# Patient Record
Sex: MALE | Race: OTHER | HISPANIC OR LATINO
[De-identification: names, ages, dates, MRNs, and addresses within clinical notes are randomized per-mention and may not be internally consistent; named-entity substitution may affect disease eponyms.]

---

## 2018-03-28 ENCOUNTER — TRANSCRIPTION ENCOUNTER (OUTPATIENT)
Age: 33
End: 2018-03-28

## 2018-03-29 ENCOUNTER — APPOINTMENT (OUTPATIENT)
Dept: PHYSICAL MEDICINE AND REHAB | Facility: CLINIC | Age: 33
End: 2018-03-29

## 2018-03-29 PROBLEM — Z00.00 ENCOUNTER FOR PREVENTIVE HEALTH EXAMINATION: Status: ACTIVE | Noted: 2018-03-29

## 2020-11-14 ENCOUNTER — OUTPATIENT (OUTPATIENT)
Dept: OUTPATIENT SERVICES | Facility: HOSPITAL | Age: 35
LOS: 1 days | End: 2020-11-14
Payer: COMMERCIAL

## 2020-11-14 DIAGNOSIS — Z11.59 ENCOUNTER FOR SCREENING FOR OTHER VIRAL DISEASES: ICD-10-CM

## 2020-11-14 PROCEDURE — U0003: CPT

## 2020-11-15 DIAGNOSIS — Z11.59 ENCOUNTER FOR SCREENING FOR OTHER VIRAL DISEASES: ICD-10-CM

## 2020-11-15 LAB — SARS-COV-2 RNA SPEC QL NAA+PROBE: SIGNIFICANT CHANGE UP

## 2021-04-23 ENCOUNTER — TRANSCRIPTION ENCOUNTER (OUTPATIENT)
Age: 36
End: 2021-04-23

## 2021-07-16 ENCOUNTER — TRANSCRIPTION ENCOUNTER (OUTPATIENT)
Age: 36
End: 2021-07-16

## 2022-04-20 ENCOUNTER — APPOINTMENT (OUTPATIENT)
Dept: ORTHOPEDIC SURGERY | Facility: CLINIC | Age: 37
End: 2022-04-20
Payer: COMMERCIAL

## 2022-04-20 VITALS — BODY MASS INDEX: 23.7 KG/M2 | WEIGHT: 160 LBS | HEIGHT: 69 IN

## 2022-04-20 DIAGNOSIS — J45.909 UNSPECIFIED ASTHMA, UNCOMPLICATED: ICD-10-CM

## 2022-04-20 DIAGNOSIS — Z78.9 OTHER SPECIFIED HEALTH STATUS: ICD-10-CM

## 2022-04-20 DIAGNOSIS — Z87.891 PERSONAL HISTORY OF NICOTINE DEPENDENCE: ICD-10-CM

## 2022-04-20 DIAGNOSIS — M25.561 PAIN IN RIGHT KNEE: ICD-10-CM

## 2022-04-20 DIAGNOSIS — M23.91 UNSPECIFIED INTERNAL DERANGEMENT OF RIGHT KNEE: ICD-10-CM

## 2022-04-20 PROCEDURE — 20611 DRAIN/INJ JOINT/BURSA W/US: CPT

## 2022-04-20 PROCEDURE — 99214 OFFICE O/P EST MOD 30 MIN: CPT | Mod: 25

## 2022-04-20 NOTE — HISTORY OF PRESENT ILLNESS
[de-identified] : The patient is a 36 year old right hand dominant male who presents today complaining of right knee pain.  Date of Injury/Onset: 11/1/21 Pain: At Rest: 3/10  With Activity: 8/10  Mechanism of injury: prolonged activity, training for a marathon This is not a Work Related Injury being treated under Worker's Compensation. This is not an athletic injury occurring associated with an interscholastic or organized sports team. Quality of symptoms: sharp pain, mild swelling, decreased ROM, instability, weakness Improves with: rest, ice, NSAIDs Worse with: prolonged activity, stairs, walking, running, stairs Prior treatment: none Prior Imaging: none Out of work/sport: currently working School/Sport/Position/Occupation: finance Additional Information: None

## 2022-04-20 NOTE — PHYSICAL EXAM
[de-identified] : Constitutional: The general appearance of the patient is well developed, well nourished, no deformities and well groomed.\par \par Gait: Gait and function is as follows: normal gait.\par \par Skin: Head and neck visualized skin is normal. Left lower extremity visualized skin is normal Right lower extremity visualized skin is normal. Thoracic Skin of the thoracic spine shows visualized skin is normal.\par \par Cardiovascular: palpable dorsalis pedis pulse bilaterally, good capillary refill in the digits of the bilateral lower extremities and no temperature or color changes in the bilateral lower extremities.\par \par Lymphatic: Normal Palpation of lymph nodes in the inguinal.\par \par Neurologic: heel rub from knee to ankle intact in the bilateral lower extremities. Deep Tendon Reflexes in Upper and Lower Extremities The reflexes are present and symmetrical in the bilateral lower extremities and No clonus in the lower extremities. The patient is oriented to time, place and person. Sensation to light touch intact in the bilateral lower extremities. Mood and Affect is normal.\par  \par \par Right Knee: Inspection of the knee is as follows: IT band tightness  \par IT band tenderness\par IT band swelling \par \par 3/23/22: X-Ray Examination of the KNEE 4 or more views Right AP, lateral, skyline and AP both knees standing view shows there are no fractures, subluxations or dislocations. No significant abnormalities are seen.

## 2022-04-20 NOTE — DISCUSSION/SUMMARY
[de-identified] : Cortisone Knee Injection - Right (IT BAND) \par The risks, benefits, and alternatives to cortisone injection were explained in full to the patient.  Risks outlined include but are not limited to infection, sepsis, bleeding, scarring, skin discoloration, temporary increase in pain, syncopal episode, failure to resolve symptoms, allergic reaction, symptom recurrence, and elevation of blood sugar in diabetics.  Patient understood the risks.  All questions were answered.  After discussion of options, patient requested an injection.  Oral informed consent was obtained and sterile prep was done of the injection site.  A mixture of 40mg of Kenalog, 2cc of 1% Lidocaine was sterilely prepared by me.  Sterile technique was used to introduce the mixture into the right knee. Ultrasound was used for proper needle placement.  Patient tolerated the procedure well.  Advised to ice the injection site this evening. \par \par Follow up after right knee MRI to eval IT band rupture. Patient has ongoing pain despite physical therapy, anti-inflammatories, rest\par \par \par The patient is instructed on a home exercise program.\par \par XIN FERNANDEZ Acting as a Scribe for Dr. Young\par I, Xin Fernandez, attest that this documentation has been prepared under the direction and in the presence of Provider Juan Young MD.\par \par Activity Modification\par The patient was advised to modify their activities.\par \par Dx / Natural History\par The patient was advised of the diagnosis.  The natural history of the pathology was explained in full to the patient in layman's terms.  Several different treatment options were discussed and explained in full to the patient including the risks and benefits of both surgical and non-surgical treatments.  All questions and concerns were answered.\par \par Pain Guide Activities\par The patient was advised to let pain guide the gradual advancement of activities.\par \par RICE\par I explained to the patient that rest, ice, compression, and elevation would benefit them.  They may return to activity after follow-up or when they no longer have any pain.

## 2022-08-04 ENCOUNTER — APPOINTMENT (OUTPATIENT)
Dept: ORTHOPEDIC SURGERY | Facility: CLINIC | Age: 37
End: 2022-08-04

## 2022-08-04 ENCOUNTER — NON-APPOINTMENT (OUTPATIENT)
Age: 37
End: 2022-08-04

## 2022-08-04 DIAGNOSIS — M76.31 ILIOTIBIAL BAND SYNDROME, RIGHT LEG: ICD-10-CM

## 2022-08-04 PROCEDURE — 99204 OFFICE O/P NEW MOD 45 MIN: CPT

## 2022-08-04 PROCEDURE — 73503 X-RAY EXAM HIP UNI 4/> VIEWS: CPT

## 2022-08-05 NOTE — ADDENDUM
[FreeTextEntry1] : Documented by Khushi Barnes acting as a scribe for Dr. Higgins and Nick Dooley PA-C on 08/04/2022. \par \par All medical record entries made by the Scribe were at my, Dr. Higgins, and Nick Dooley's, direction and\par personally dictated by me on 08/04/2022. I have reviewed the chart and agree that the record\par accurately reflects my personal performance of the history, physical exam, procedure and imaging.

## 2022-08-05 NOTE — PHYSICAL EXAM
[de-identified] : Physical Exam: \par General: Well appearing, no acute distress \par Neurologic: A&Ox3, No focal deficits \par Head: NCAT without abrasions, lacerations, or ecchymosis to head, face, or scalp \par Eyes: No scleral icterus, no gross abnormalities \par Respiratory: Equal chest wall expansion bilaterally, no accessory muscle use \par Lymphatic: No lymphadenopathy palpated \par Skin: Warm and dry \par Psychiatric: Normal mood and affect\par \par Examination of the Right hip reveals no obvious deformity or leg length discrepancy. There is no swelling noted. The patient is nontender to palpation over the greater trochanter, groin, and IT band. Sits normally with symmetric ER on both sides. The patients range of motion is to 90 degrees of hip flexion, 40 degrees of abduction, 20 degrees of adduction, 15 degrees of internal rotation and 35 degrees of external rotation. Significant weakness of the hip flexor. The patient has 5/5 strength to resisted hip flexion, abduction and adduction. The patient has a positive NATALIA and positive FADIR Test. Positve Padilla Test. Positive resisted SLR test at 30 degrees of hip flexion ([facility]). Negative Piriformis Test. No SI joint instability. There is no pain with logrolling. The calf and thigh are soft and nontender bilaterally. The patient is grossly neurovascularly intact distally. \par Right knee demonstrates no tenderness to palpation.  There is no effusion.  There is no tenderness through the IT band extending down to the fibular head.  He has full painless motion of the knee and a negative Aileen and grind.  There is slight pes planus deformity of both feet. [de-identified] : The following radiographs were ordered and read by me during this patients visit. I reviewed each radiograph in detail with the patient and discussed the findings as highlighted below. \par \par 4 views of the Right (R) knee show no fracture, dislocation or bony lesions. There is no evidence of degenerative change in the medial, lateral and patellofemoral compartments with maintenance of the joint space. Otherwise unremarkable. \par \par Bilateral AP, frog leg, Gil views and false profile views were performed today in the office. They demonstrate femoroacetabular impingement of the Right (R)  hip. Alpha angle is 57 with asphericity of the femoral head. Lateral center edge angle is 33. Anterior center edge angle is 40. \par \par Procedure: MRI of Right (R) Knee No Contrast\par Dated: 05/27/2022\par \par Impression:\par Sprain (grade 1 injury) of the MCL. \par Minimal joint effusion.\par

## 2022-08-05 NOTE — DISCUSSION/SUMMARY
[de-identified] : DAGOBERTO LESLIE is a 37 year y/o male who presents for follow up visit of Right (R) knee pain. He reports he has been experiencing R knee pain since April 2022. Patient is avid runner and was training to run April [employer]. He saw Dr. Young who provided him with an IT band injection and ref'd him to PT. Patient reports no relief with IT band injection. He states PT provided him enough relief to get through marathon. He endorses pain and tightness over lateral knee/IT band insertion. He reports pain with running and walking. Patient denies numbness and tingling to the extremities. He denies use of OTC pain medications.  He denies lower back pain.  He does admit to occasional pain in his hip and groin.\par \par We had a thorough discussion regarding the nature of his pain, the pathophysiology, as well as all treatment options. Based off of his clinical exam I believe his primary issue is hip impingement along with possible labral tear. Considering the patient's current presentation of pain, physical exam, and radiographs an MRI is indicated at this time. A prescription for this was given to the patient. We will go over the MRI results with him upon obtaining the results in the office and advise him of further treatment options. He agrees with the above plan and all questions were answered. \par \par

## 2022-08-05 NOTE — HISTORY OF PRESENT ILLNESS
[Worsening] : worsening [___ mths] : [unfilled] month(s) ago [3] : a current pain level of 3/10 [4] : an average pain level of 4/10 [2] : a minimum pain level of 2/10 [5] : a maximum pain level of 5/10 [Running] : worsened by running [Walking] : worsened by walking [de-identified] : DAGOBERTO LESLIE is a 37 year y/o male who presents for follow up visit of Right (R) knee pain. He reports he has been experiencing R knee pain since April 2022. Patient is avid runner and was training to run [employer]. He saw Dr. Young who provided him with an IT band injection and ref'd him to PT. Patient reports no relief with IT band injection. He states PT provided him enough relief to get through marathon. He endorses pain and tightness over lateral knee/IT band insertion. He reports pain with running and walking. Patient denies numbness and tingling to the extremities. He denies use of OTC pain medications.  He denies lower back pain.  He does admit to occasional pain in his hip and groin.\par

## 2022-09-09 ENCOUNTER — APPOINTMENT (OUTPATIENT)
Dept: MRI IMAGING | Facility: CLINIC | Age: 37
End: 2022-09-09

## 2022-09-09 ENCOUNTER — RESULT REVIEW (OUTPATIENT)
Age: 37
End: 2022-09-09

## 2022-09-09 ENCOUNTER — OUTPATIENT (OUTPATIENT)
Dept: OUTPATIENT SERVICES | Facility: HOSPITAL | Age: 37
LOS: 1 days | End: 2022-09-09
Payer: COMMERCIAL

## 2022-09-09 DIAGNOSIS — M25.851 OTHER SPECIFIED JOINT DISORDERS, RIGHT HIP: ICD-10-CM

## 2022-09-09 PROCEDURE — 73721 MRI JNT OF LWR EXTRE W/O DYE: CPT

## 2022-09-09 PROCEDURE — 73721 MRI JNT OF LWR EXTRE W/O DYE: CPT | Mod: 26,RT

## 2022-09-15 ENCOUNTER — NON-APPOINTMENT (OUTPATIENT)
Age: 37
End: 2022-09-15

## 2022-09-20 ENCOUNTER — APPOINTMENT (OUTPATIENT)
Dept: ORTHOPEDIC SURGERY | Facility: CLINIC | Age: 37
End: 2022-09-20
Payer: COMMERCIAL

## 2022-09-20 DIAGNOSIS — M25.851 OTHER SPECIFIED JOINT DISORDERS, RIGHT HIP: ICD-10-CM

## 2022-09-20 PROCEDURE — XXXXX: CPT | Mod: 1L

## 2022-10-24 ENCOUNTER — NON-APPOINTMENT (OUTPATIENT)
Age: 37
End: 2022-10-24

## 2022-12-17 ENCOUNTER — NON-APPOINTMENT (OUTPATIENT)
Age: 37
End: 2022-12-17

## 2022-12-18 ENCOUNTER — EMERGENCY (EMERGENCY)
Facility: HOSPITAL | Age: 37
LOS: 0 days | Discharge: HOME | End: 2022-12-18
Attending: STUDENT IN AN ORGANIZED HEALTH CARE EDUCATION/TRAINING PROGRAM
Payer: COMMERCIAL

## 2022-12-18 VITALS
OXYGEN SATURATION: 100 % | HEART RATE: 92 BPM | DIASTOLIC BLOOD PRESSURE: 88 MMHG | TEMPERATURE: 99 F | RESPIRATION RATE: 17 BRPM | SYSTOLIC BLOOD PRESSURE: 144 MMHG

## 2022-12-18 VITALS
OXYGEN SATURATION: 99 % | HEIGHT: 70 IN | HEART RATE: 88 BPM | RESPIRATION RATE: 18 BRPM | WEIGHT: 175.05 LBS | TEMPERATURE: 98 F | DIASTOLIC BLOOD PRESSURE: 71 MMHG | SYSTOLIC BLOOD PRESSURE: 122 MMHG

## 2022-12-18 DIAGNOSIS — M25.571 PAIN IN RIGHT ANKLE AND JOINTS OF RIGHT FOOT: ICD-10-CM

## 2022-12-18 DIAGNOSIS — M25.561 PAIN IN RIGHT KNEE: ICD-10-CM

## 2022-12-18 DIAGNOSIS — S93.401A SPRAIN OF UNSPECIFIED LIGAMENT OF RIGHT ANKLE, INITIAL ENCOUNTER: ICD-10-CM

## 2022-12-18 DIAGNOSIS — F10.929 ALCOHOL USE, UNSPECIFIED WITH INTOXICATION, UNSPECIFIED: ICD-10-CM
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
## 2022-12-18 DIAGNOSIS — S81.011A LACERATION WITHOUT FOREIGN BODY, RIGHT KNEE, INITIAL ENCOUNTER: ICD-10-CM

## 2022-12-18 LAB
ALBUMIN SERPL ELPH-MCNC: 4.1 G/DL — SIGNIFICANT CHANGE UP (ref 3.3–5)
ALP SERPL-CCNC: 70 U/L — SIGNIFICANT CHANGE UP (ref 40–120)
ALT FLD-CCNC: 43 U/L — SIGNIFICANT CHANGE UP (ref 12–78)
ANION GAP SERPL CALC-SCNC: 7 MMOL/L — SIGNIFICANT CHANGE UP (ref 5–17)
AST SERPL-CCNC: 32 U/L — SIGNIFICANT CHANGE UP (ref 15–37)
BASOPHILS # BLD AUTO: 0.05 K/UL — SIGNIFICANT CHANGE UP (ref 0–0.2)
BASOPHILS NFR BLD AUTO: 0.4 % — SIGNIFICANT CHANGE UP (ref 0–2)
BILIRUB SERPL-MCNC: 0.5 MG/DL — SIGNIFICANT CHANGE UP (ref 0.2–1.2)
BUN SERPL-MCNC: 9 MG/DL — SIGNIFICANT CHANGE UP (ref 7–23)
CALCIUM SERPL-MCNC: 8 MG/DL — LOW (ref 8.5–10.1)
CHLORIDE SERPL-SCNC: 111 MMOL/L — HIGH (ref 96–108)
CO2 SERPL-SCNC: 26 MMOL/L — SIGNIFICANT CHANGE UP (ref 22–31)
CREAT SERPL-MCNC: 0.88 MG/DL — SIGNIFICANT CHANGE UP (ref 0.5–1.3)
EGFR: 115 ML/MIN/1.73M2 — SIGNIFICANT CHANGE UP
EOSINOPHIL # BLD AUTO: 0.07 K/UL — SIGNIFICANT CHANGE UP (ref 0–0.5)
EOSINOPHIL NFR BLD AUTO: 0.5 % — SIGNIFICANT CHANGE UP (ref 0–6)
GLUCOSE SERPL-MCNC: 88 MG/DL — SIGNIFICANT CHANGE UP (ref 70–99)
HCT VFR BLD CALC: 41.7 % — SIGNIFICANT CHANGE UP (ref 39–50)
HGB BLD-MCNC: 14.7 G/DL — SIGNIFICANT CHANGE UP (ref 13–17)
IMM GRANULOCYTES NFR BLD AUTO: 0.3 % — SIGNIFICANT CHANGE UP (ref 0–0.9)
LYMPHOCYTES # BLD AUTO: 2.96 K/UL — SIGNIFICANT CHANGE UP (ref 1–3.3)
LYMPHOCYTES # BLD AUTO: 22.6 % — SIGNIFICANT CHANGE UP (ref 13–44)
MCHC RBC-ENTMCNC: 30.2 PG — SIGNIFICANT CHANGE UP (ref 27–34)
MCHC RBC-ENTMCNC: 35.3 GM/DL — SIGNIFICANT CHANGE UP (ref 32–36)
MCV RBC AUTO: 85.6 FL — SIGNIFICANT CHANGE UP (ref 80–100)
MONOCYTES # BLD AUTO: 0.95 K/UL — HIGH (ref 0–0.9)
MONOCYTES NFR BLD AUTO: 7.2 % — SIGNIFICANT CHANGE UP (ref 2–14)
NEUTROPHILS # BLD AUTO: 9.04 K/UL — HIGH (ref 1.8–7.4)
NEUTROPHILS NFR BLD AUTO: 69 % — SIGNIFICANT CHANGE UP (ref 43–77)
PLATELET # BLD AUTO: 268 K/UL — SIGNIFICANT CHANGE UP (ref 150–400)
POTASSIUM SERPL-MCNC: 3.8 MMOL/L — SIGNIFICANT CHANGE UP (ref 3.5–5.3)
POTASSIUM SERPL-SCNC: 3.8 MMOL/L — SIGNIFICANT CHANGE UP (ref 3.5–5.3)
PROT SERPL-MCNC: 7.6 GM/DL — SIGNIFICANT CHANGE UP (ref 6–8.3)
RBC # BLD: 4.87 M/UL — SIGNIFICANT CHANGE UP (ref 4.2–5.8)
RBC # FLD: 12.4 % — SIGNIFICANT CHANGE UP (ref 10.3–14.5)
SODIUM SERPL-SCNC: 144 MMOL/L — SIGNIFICANT CHANGE UP (ref 135–145)
WBC # BLD: 13.11 K/UL — HIGH (ref 3.8–10.5)
WBC # FLD AUTO: 13.11 K/UL — HIGH (ref 3.8–10.5)

## 2022-12-18 PROCEDURE — G1004: CPT

## 2022-12-18 PROCEDURE — 73562 X-RAY EXAM OF KNEE 3: CPT | Mod: 26,RT

## 2022-12-18 PROCEDURE — 73590 X-RAY EXAM OF LOWER LEG: CPT | Mod: 26,RT

## 2022-12-18 PROCEDURE — 76376 3D RENDER W/INTRP POSTPROCES: CPT | Mod: 26

## 2022-12-18 PROCEDURE — 73700 CT LOWER EXTREMITY W/O DYE: CPT | Mod: 26,RT,MG

## 2022-12-18 PROCEDURE — 12002 RPR S/N/AX/GEN/TRNK2.6-7.5CM: CPT

## 2022-12-18 PROCEDURE — 80053 COMPREHEN METABOLIC PANEL: CPT

## 2022-12-18 PROCEDURE — 96374 THER/PROPH/DIAG INJ IV PUSH: CPT | Mod: XU

## 2022-12-18 PROCEDURE — 99284 EMERGENCY DEPT VISIT MOD MDM: CPT | Mod: 25

## 2022-12-18 PROCEDURE — 99285 EMERGENCY DEPT VISIT HI MDM: CPT | Mod: 25

## 2022-12-18 PROCEDURE — 73590 X-RAY EXAM OF LOWER LEG: CPT | Mod: RT

## 2022-12-18 PROCEDURE — 36415 COLL VENOUS BLD VENIPUNCTURE: CPT

## 2022-12-18 PROCEDURE — 73610 X-RAY EXAM OF ANKLE: CPT | Mod: 26,RT

## 2022-12-18 PROCEDURE — 73700 CT LOWER EXTREMITY W/O DYE: CPT | Mod: MG,RT

## 2022-12-18 PROCEDURE — 85025 COMPLETE CBC W/AUTO DIFF WBC: CPT

## 2022-12-18 PROCEDURE — 99285 EMERGENCY DEPT VISIT HI MDM: CPT

## 2022-12-18 PROCEDURE — 73562 X-RAY EXAM OF KNEE 3: CPT | Mod: RT

## 2022-12-18 PROCEDURE — 76376 3D RENDER W/INTRP POSTPROCES: CPT

## 2022-12-18 PROCEDURE — 96375 TX/PRO/DX INJ NEW DRUG ADDON: CPT | Mod: XU

## 2022-12-18 PROCEDURE — 73610 X-RAY EXAM OF ANKLE: CPT | Mod: RT

## 2022-12-18 RX ORDER — KETOROLAC TROMETHAMINE 30 MG/ML
15 SYRINGE (ML) INJECTION ONCE
Refills: 0 | Status: DISCONTINUED | OUTPATIENT
Start: 2022-12-18 | End: 2022-12-18

## 2022-12-18 RX ORDER — TETANUS TOXOID, REDUCED DIPHTHERIA TOXOID AND ACELLULAR PERTUSSIS VACCINE, ADSORBED 5; 2.5; 8; 8; 2.5 [IU]/.5ML; [IU]/.5ML; UG/.5ML; UG/.5ML; UG/.5ML
0.5 SUSPENSION INTRAMUSCULAR ONCE
Refills: 0 | Status: COMPLETED | OUTPATIENT
Start: 2022-12-18 | End: 2022-12-18

## 2022-12-18 RX ORDER — ACETAMINOPHEN 500 MG
975 TABLET ORAL ONCE
Refills: 0 | Status: COMPLETED | OUTPATIENT
Start: 2022-12-18 | End: 2022-12-18

## 2022-12-18 RX ORDER — SODIUM CHLORIDE 9 MG/ML
1000 INJECTION INTRAMUSCULAR; INTRAVENOUS; SUBCUTANEOUS ONCE
Refills: 0 | Status: COMPLETED | OUTPATIENT
Start: 2022-12-18 | End: 2022-12-18

## 2022-12-18 RX ORDER — CEFAZOLIN SODIUM 1 G
1000 VIAL (EA) INJECTION ONCE
Refills: 0 | Status: COMPLETED | OUTPATIENT
Start: 2022-12-18 | End: 2022-12-18

## 2022-12-18 RX ORDER — IBUPROFEN 200 MG
400 TABLET ORAL ONCE
Refills: 0 | Status: DISCONTINUED | OUTPATIENT
Start: 2022-12-18 | End: 2022-12-18

## 2022-12-18 RX ADMIN — Medication 1000 MILLIGRAM(S): at 12:21

## 2022-12-18 RX ADMIN — SODIUM CHLORIDE 1000 MILLILITER(S): 9 INJECTION INTRAMUSCULAR; INTRAVENOUS; SUBCUTANEOUS at 12:23

## 2022-12-18 RX ADMIN — Medication 975 MILLIGRAM(S): at 12:21

## 2022-12-18 RX ADMIN — Medication 15 MILLIGRAM(S): at 12:21

## 2022-12-18 NOTE — ED STATDOCS - OBJECTIVE STATEMENT
36 y/o M presents to ED BIB family s/p fall c/o R knee pain and laceration. Pt was drunk when he fell down the stairs, landing on his R knee at around 3am. Also reporting R ankle pain. Pt went to  earlier and was sent to the ED for further eval. Reports some numbness. Denies back pain. Denies head strike. Last tetanus was within the last 10 years. No pain meds PTA. 36 y/o M presents to ED BIB family s/p fall c/o R knee pain and laceration. Pt was drunk when he fell down the stairs, landing on his R knee at around 3am. Also reporting R ankle pain. Denies pain/injury elsewhere. Pt went to  earlier and was sent to the ED for further eval. Reports some numbness. Denies back pain. Denies head strike. Last tetanus was within the last 10 years. No pain meds PTA. 38 y/o M presents to ED BIB family s/p fall c/o R knee pain and laceration. Pt was drunk when he fell down the stairs, landing on his R knee at around 3am. Also reporting R ankle pain. Denies pain/injury elsewhere. Pt went to  earlier and was sent to the ED for further eval. Reports some numbness. Denies back pain. Denies head strike. Unsure of last tetanus date. No pain meds PTA. Using crutches to ambulate.

## 2022-12-18 NOTE — ED PROCEDURE NOTE - CPROC ED POST PROC CARE GUIDE1
Verbal/written post procedure instructions were given to patient/caregiver./Instructed patient/caregiver regarding signs and symptoms of infection./Keep the cast/splint/dressing clean and dry.
Verbal/written post procedure instructions were given to patient/caregiver./Instructed patient/caregiver regarding signs and symptoms of infection./Elevate the injured extremity as instructed./Keep the cast/splint/dressing clean and dry.

## 2022-12-18 NOTE — ED STATDOCS - PATIENT PORTAL LINK FT
You can access the FollowMyHealth Patient Portal offered by [facility] by registering at the following website: [email or web address]. By joining [email or web address]’s FollowMyHealth portal, you will also be able to view your health information using other applications (apps) compatible with our system.

## 2022-12-18 NOTE — ED STATDOCS - ATTENDING APP SHARED VISIT CONTRIBUTION OF CARE
I, Lydia Severino DO, performed the initial face to face bedside interview with this patient regarding history of present illness, review of symptoms and relevant past medical, social and family history.  I completed an independent physical examination.  I was the initial provider who evaluated this patient.   I personally saw the patient and performed a substantive portion of the visit including all aspects of the medical decision making.  I have signed out the follow up of any pending tests (i.e. labs, radiological studies) to the [facility].  I have communicated the patient’s plan of care and disposition with the [facility].  The history, relevant review of systems, past medical and surgical history, medical decision making, and physical examination was documented by the scribe in my presence and I attest to the accuracy of the documentation.

## 2022-12-18 NOTE — ED STATDOCS - PROGRESS NOTE DETAILS
signed Fariba Gomez PA-C Pt initially seen and examined by Dr. Severino in intake.  37M fell down some steps at home last night around 3am. Pt says he had drank a lot while watching a basketball game with people and doesn't clearly remember how he fell or landed. he is c/o pain and laceration to right knee and pain in right ankle. No HA/neck/back pain. right knee with 4cm horizontal lac just inferior to right patella, joint visible through wound. extensor mechanism intact. Plan CT  and xray, I spoke to ortho for consult. Pt agrees with plan of  care. signed Fariba Gomez PA-C   Pt seen in ED by ortho resident. No apparent joint injury. Pt may have knee sprain, exam limited by pain at this time. recommends simple lac repair, knee immobilizer, outpt f/u Jania this week. Pt agrees with plan of  care.

## 2022-12-18 NOTE — ED STATDOCS - CLINICAL SUMMARY MEDICAL DECISION MAKING FREE TEXT BOX
38 y/o M presents w R knee pain w/ laceration inferior to patella. Also w/ R ankle pain. Will eval for traumatic arthrotomy with xrays, CT, impueritic antibx and ortho eval. Do not suspect High ankle sprain. Will provide pain control and update tdap. 36 y/o M presents w R knee pain w/ laceration inferior to patella. Also w/ R ankle pain. Will eval for traumatic arthrotomy with xrays, CT, empiric antibx and ortho eval. Internal knee derangement, possible ligamentous injury. Do not suspect High ankle sprain. Will provide pain control, update tdap 36 y/o M presents w R knee pain w/ laceration inferior to patella. Also w/ R ankle pain. Will eval for traumatic arthrotomy with xrays, CT, empiric antibx and ortho eval. Internal knee derangement, possible ligamentous injury. Do not suspect High ankle sprain. Will provide pain control, update tdap    signed Fariba Gomez PA-C   pt with knee pain and lac s/p fall. No fx or apparent joint injury but pt may have knee sprain. Seen by ortho. simple lac repair, abx ppx, immobilization and crutches. WBAT, NSAIDs. Pt also with mild ankle pain, no fx on xray, pt declines splinting. outpt f/u ortho. return precautions given. WBC likely due to stress reaction. Pt feeling well, pt and family agree with DC and plan of care. Maycol DO: 38 y/o M presents w R knee pain w/ laceration inferior to patella sustained hours ago after mechanical fall down stairs. Also w/ R ankle pain. Will eval for traumatic arthrotomy with xrays, CT, empiric antibx and ortho eval. Internal knee derangement, possible ligamentous injury. Do not suspect High ankle sprain. Will provide pain control, update tdap    signed Fariba Gomez PA-C   pt with knee pain and lac s/p fall. No fx or apparent joint injury but pt may have knee sprain. Seen by ortho. simple lac repair, abx ppx, immobilization and crutches. WBAT, NSAIDs. Pt also with mild ankle pain, no fx on xray, pt declines splinting. outpt f/u ortho. return precautions given. WBC likely due to stress reaction. Pt feeling well, pt and family agree with DC and plan of care.

## 2022-12-18 NOTE — ED STATDOCS - CARE PLAN
Principal Discharge DX:	Laceration of knee  Secondary Diagnosis:	Knee pain  Secondary Diagnosis:	Ankle sprain   1

## 2022-12-18 NOTE — ED ADULT TRIAGE NOTE - CHIEF COMPLAINT QUOTE
Intraductal carcinoma  09/14/2018    Active  Xiang Philip Pt. to the ED BIB Family from Urgent Care for Right Knee Laceration - Pt. states he fell last night from stairs- denies any other injuries

## 2022-12-18 NOTE — ED STATDOCS - CARE PROVIDER_API CALL
Salas Dykes)  Orthopaedic Surgery  [address], [place] [id number]  Phone: [phone]  Fax: [phone]  Follow Up Time:

## 2022-12-18 NOTE — ED PROCEDURE NOTE - CPROC ED TIME OUT STATEMENT1
“Patient's name, , procedure and correct site were confirmed during the [place] Timeout.”
“Patient's name, , procedure and correct site were confirmed during the [place] Timeout.”

## 2022-12-18 NOTE — ED ADULT NURSE NOTE - CHIEF COMPLAINT QUOTE
Pt. to the ED BIB Family from Urgent Care for Right Knee Laceration - Pt. states he fell last night from stairs- denies any other injuries

## 2022-12-18 NOTE — CONSULT NOTE ADULT - SUBJECTIVE AND OBJECTIVE BOX
37y Male presents with right anterior knee wound s/p falling down stairs while intoxicated. Patient doesn't remember much from the event. Denies numbness/tingling in the affected extremity. Denies head strike/LOC/other orthopedic injuries at this time. Patient ambulates without assistance at baseline. Lives with wife who is an OR nurse at . No active bleeding. Able to walk with a limp 2/2 pain.     PAST MEDICAL & SURGICAL HISTORY:    Home Medications:    Allergies    No Known Allergies    Intolerances                              14.7   13.11 )-----------( 268      ( 18 Dec 2022 12:57 )             41.7     12-18    144  |  111<H>  |  9   ----------------------------<  88  3.8   |  26  |  0.88    Ca    8.0<L>      18 Dec 2022 12:57    TPro  7.6  /  Alb  4.1  /  TBili  0.5  /  DBili  x   /  AST  32  /  ALT  43  /  AlkPhos  70  12-18            Vital Signs Last 24 Hrs  T(C): 36.6 (18 Dec 2022 11:11), Max: 36.6 (18 Dec 2022 11:11)  T(F): 97.9 (18 Dec 2022 11:11), Max: 97.9 (18 Dec 2022 11:11)  HR: 88 (18 Dec 2022 11:11) (88 - 88)  BP: 122/71 (18 Dec 2022 11:11) (122/71 - 122/71)  BP(mean): 82 (18 Dec 2022 11:11) (82 - 82)  RR: 18 (18 Dec 2022 11:11) (18 - 18)  SpO2: 99% (18 Dec 2022 11:11) (99% - 99%)    Parameters below as of 18 Dec 2022 11:11  Patient On (Oxygen Delivery Method): room air        PHYSICAL EXAM  General: NAD, Awake and Alert    RLE:  3cm laceration over apex of patellar tendon  +swelling about the knee, likely traumatic hemarthrosis   able to SLR right leg  able to achieve full extension   Only able to passively flex to 90 degrees  Actively pain with flexion past 40 deg   No gross deformity  TTP diffusely about the knee, med/lateral joint lines  Guarding on knee exam 2/2 pain  NTTP over the bony prominences of the hip/ankle/foot  painless passive/active ROM of the hip/ankle/foot  L2-S1 SILT  No V/V instability at 0, 30 deg  Ant drawer ? positive, patient guarding unable to get good exam  Post drawer and lachman limited by patient guarding  unable to complete manjeet 2/2 pain  motor grossly intact throughout hip flexors/quads/hams/TA/EHL/FHL/GSC  + DP/PT pulses  no pain with log roll, no pain on axial loading  compartments soft and compressible  calves nontender          SECONDARY EXAM: Benign, SILT throughout, motor grossly intact throughout, no other orthopedic injuries at this time, compartments soft and compressible        IMAGING:  XR : +Effusion no obvious fx  CT : Neg for fx or traumatic arthrotomy please refer to report for further details    Assessment/Plan:  37y Male with R knee laceration, called to r/o traumatic arthrotomy; Possible ACL injury     -No obvious gross instability of the knee   -Pain control as needed  -DVT ppx  -S/p dose of ancef in ED  -S/p tetanus   -CT neg for traumatic arthrotomy or fx, please refer to official radiology report   -XR reviewed  -ED to copiously irrigate and suture, cover with xeroform   -WBAT in knee immobilizer, would rec bulky torres type dressing for swelling (webril/ace wrap)  -Assistive devices as needed (crutches, etc)  -Rec d/c w/ keflex 500 qid x5-7 days for abx ppx   -Can take a baby aspirin for vte ppx if not ambulating 2/2 pain  -Pt to see Dr. Dykes in the office this week  -FU outpatient for consideration of MRI of the right knee to r/o lig inj if continued pain  -Discussed w/ Dr. Dykes  -Will advise if plan changes   37y Male presents with right anterior knee wound s/p falling down stairs while intoxicated. Patient doesn't remember much from the event. Denies numbness/tingling in the affected extremity. Denies head strike/LOC/other orthopedic injuries at this time. Patient ambulates without assistance at baseline. Lives with wife who is an OR nurse at . No active bleeding. Able to walk with a limp 2/2 pain.     PAST MEDICAL & SURGICAL HISTORY:    Home Medications:    Allergies    No Known Allergies    Intolerances                              14.7   13.11 )-----------( 268      ( 18 Dec 2022 12:57 )             41.7     12-18    144  |  111<H>  |  9   ----------------------------<  88  3.8   |  26  |  0.88    Ca    8.0<L>      18 Dec 2022 12:57    TPro  7.6  /  Alb  4.1  /  TBili  0.5  /  DBili  x   /  AST  32  /  ALT  43  /  AlkPhos  70  12-18            Vital Signs Last 24 Hrs  T(C): 36.6 (18 Dec 2022 11:11), Max: 36.6 (18 Dec 2022 11:11)  T(F): 97.9 (18 Dec 2022 11:11), Max: 97.9 (18 Dec 2022 11:11)  HR: 88 (18 Dec 2022 11:11) (88 - 88)  BP: 122/71 (18 Dec 2022 11:11) (122/71 - 122/71)  BP(mean): 82 (18 Dec 2022 11:11) (82 - 82)  RR: 18 (18 Dec 2022 11:11) (18 - 18)  SpO2: 99% (18 Dec 2022 11:11) (99% - 99%)    Parameters below as of 18 Dec 2022 11:11  Patient On (Oxygen Delivery Method): room air        PHYSICAL EXAM  General: NAD, Awake and Alert    RLE:  3cm laceration over apex of patellar tendon  +swelling about the knee, likely traumatic hemarthrosis   able to SLR right leg  able to achieve full extension   Only able to passively flex to 90 degrees  Actively pain with flexion past 40 deg   No gross deformity  TTP diffusely about the knee, med/lateral joint lines  Guarding on knee exam 2/2 pain  NTTP over the bony prominences of the hip/ankle/foot  painless passive/active ROM of the hip/ankle/foot  L2-S1 SILT  No V/V instability at 0, 30 deg  Ant drawer ? positive, patient guarding unable to get good exam  Post drawer and lachman limited by patient guarding  unable to complete manjeet 2/2 pain  motor grossly intact throughout hip flexors/quads/hams/TA/EHL/FHL/GSC  + DP/PT pulses  no pain with log roll, no pain on axial loading  compartments soft and compressible  calves nontender          SECONDARY EXAM: Benign, SILT throughout, motor grossly intact throughout, no other orthopedic injuries at this time, compartments soft and compressible        IMAGING:  XR : +Effusion no obvious fx  CT : Neg for fx or traumatic arthrotomy please refer to report for further details    Assessment/Plan:  37y Male with R knee laceration, called to r/o traumatic arthrotomy; Possible ACL injury     -No obvious gross instability of the knee   -Pain control as needed  -DVT ppx  -S/p dose of ancef in ED  -S/p tetanus   -CT neg for traumatic arthrotomy or fx, please refer to official radiology report   -XR reviewed  -ED to copiously irrigate and suture, cover with xeroform   -WBAT in knee immobilizer (wound protection), would rec bulky torres type dressing for swelling (webril/ace wrap)  -Assistive devices as needed (crutches, etc)  -Rec d/c w/ keflex 500 qid x5-7 days for abx ppx   -Can take a baby aspirin for vte ppx if not ambulating 2/2 pain  -Pt to see Dr. Dykes in the office this week  -FU outpatient for consideration of MRI of the right knee to r/o lig inj if continued pain  -Discussed w/ Dr. Dykes  -Will advise if plan changes

## 2022-12-18 NOTE — ED STATDOCS - NSFOLLOWUPINSTRUCTIONS_ED_ALL_ED_FT
FOLLOW UP WITH DR BROWNE THIS WEEK. CALL THE OFFICE TO MAKE AN APPOINTMENT. RETURN TO ER FOR ANY WORSENING SYMPTOMS OR NEW CONCERNS.     Laceration    WHAT YOU NEED TO KNOW:    A laceration is an injury to the skin and the soft tissue underneath it. Lacerations happen when you are cut or hit by something. They can happen anywhere on the body.     DISCHARGE INSTRUCTIONS:    Return to the emergency department if:     You have heavy bleeding or bleeding that does not stop after 10 minutes of holding firm, direct pressure over the wound.       Your wound opens up.     Contact your healthcare provider if:     You have a fever or chills.       Your laceration is red, warm, or swollen.      You have red streaks on your skin coming from your wound.      You have white or yellow drainage from the wound that smells bad.      You have pain that gets worse, even after treatment.       You have questions or concerns about your condition or care.     Medicines:     Prescription pain medicine may be given. Ask how to take this medicine safely.       Antibiotics help treat or prevent a bacterial infection.       Take your medicine as directed. Contact your healthcare provider if you think your medicine is not helping or if you have side effects. Tell him or her if you are allergic to any medicine. Keep a list of the medicines, vitamins, and herbs you take. Include the amounts, and when and why you take them. Bring the list or the pill bottles to follow-up visits. Carry your medicine list with you in case of an emergency.    Care for your wound as directed:     Do not get your wound wet until your healthcare provider says it is okay. Do not soak your wound in water. Do not go swimming until your healthcare provider says it is okay. Carefully wash the wound with soap and water. Gently pat the area dry or allow it to air dry.       Change your bandages when they get wet, dirty, or after washing. Apply new, clean bandages as directed. Do not apply elastic bandages or tape too tight. Do not put powders or lotions over your incision.       Apply antibiotic ointment as directed. Your healthcare provider may give you antibiotic ointment to put over your wound if you have stitches. If you have strips of tape over your incision, let them dry up and fall off on their own. If they do not fall off within 14 days, gently remove them. If you have glue over your wound, do not remove or pick at it. If your glue comes off, do not replace it with glue that you have at home.       Check your wound every day for signs of infection such as swelling, redness, or pus.     Self-care:     Apply ice on your wound for 15 to 20 minutes every hour or as directed. Use an ice pack, or put crushed ice in a plastic bag. Cover it with a towel. Ice helps prevent tissue damage and decreases swelling and pain.      Use a splint as directed. A splint will decrease movement and stress on your wound. It may help it heal faster. A splint may be used for lacerations over joints or areas of your body that bend. Ask your healthcare provider how to apply and remove a splint.       Decrease scarring of your wound by applying ointments as directed. Do not apply ointments until your healthcare provider says it is okay. You may need to wait until your wound is healed. Ask which ointment to buy and how often to use it. After your wound is healed, use sunscreen over the area when you are out in the sun. You should do this for at least 6 months to 1 year after your injury.     Follow up with your healthcare provider as directed: You may need to follow up in 24 to 48 hours to have your wound checked for infection. You will need to return in 3 to 14 days if you have stitches or staples so they can be removed. Care for your wound as directed to prevent infection and help it heal. Write down your questions so you remember to ask them during your visits.     Knee Sprain, Adult     A knee sprain is a stretch or tear in a knee ligament. Knee ligaments are bands of tissue that connect bones in the knee to each other.  What are the causes?  This condition often results from:  A fall.An injury to the knee.What are the signs or symptoms?  Symptoms of this condition include:  Trouble bending the leg.Swelling in the knee.Bruising around the knee.Tenderness or pain in the knee.Muscle spasms around the knee.How is this diagnosed?  This condition may be diagnosed based on:  A physical exam.What happened just before you started to have symptoms.Tests, including:  An X-ray. This may be done to make sure no bones are broken.An MRI. This may be done to check if the ligament is torn.Stress testing of the knee. This may be done to check ligament damage.How is this treated?  Treatment for this condition may involve:  Keeping the knee still (immobilized) with a cast, brace, or splint.Applying ice to the knee. This helps with pain and swelling.Keeping the knee raised (elevated) above the level of your heart when you are resting. This helps with pain and swelling.Taking medicine for pain.Exercises to prevent or limit permanent weakness or stiffness in your knee.Surgery to reconnect the ligament to the bone or to reconstruct it. This may be needed if the ligament tore all the way.Follow these instructions at home:  If you have a splint or brace:     Wear the splint or brace as told by your health care provider. Remove it only as told by your health care provider.Loosen the splint or brace if your toes tingle, become numb, or turn cold and blue.Keep the splint or brace clean.If the splint or brace is not waterproof:  Do not let it get wet.Cover it with a watertight covering when you take a bath or a shower.If you have a cast:     Do not stick anything inside the cast to scratch your skin. Doing that increases your risk of infection.Check the skin around the cast every day. Tell your health care provider about any concerns.You may put lotion on dry skin around the edges of the cast. Do not put lotion on the skin underneath the cast.Keep the cast clean.If the cast is not waterproof:  Do not let it get wet.Cover it with a watertight covering when you take a bath or a shower.Managing pain, stiffness, and swelling        If directed, put ice on the injured area.  If you have a removable splint or brace, remove it as told by your health care provider.Put ice in a plastic bag.Place a towel between your skin and the bag or between your cast and the bag.Leave the ice on for 20 minutes, 2–3 times a day.Gently move your toes often to avoid stiffness and to lessen swelling.Elevate the injured area above the level of your heart while you are sitting or lying down.Take over-the-counter and prescription medicines only as told by your health care provider.General instructions     Do exercises as told by your health care provider.Keep all follow-up visits as told by your health care provider. This is important.Contact a health care provider if:  You have pain that gets worse.The cast, brace, or splint does not fit right.The cast, brace, or splint gets damaged.Get help right away if:  You cannot use your injured joint to support any of your body weight (cannot bear weight).You cannot move the injured joint.You cannot walk more than a few steps without pain or without your knee buckling.You have significant pain, swelling, or numbness below the cast, brace, or splint.This information is not intended to replace advice given to you by your health care provider. Make sure you discuss any questions you have with your health care provider.

## 2022-12-18 NOTE — ED STATDOCS - PHYSICAL EXAMINATION
I have reviewed the triage vital signs.  Const: AAOx3, in NAD  Eyes: no conjunctival injection  HENT: NCAT, Neck supple, oral mucosa moist  CV: RRR, +S1, S2  Resp: CTAB, no respiratory distress  GI: Abdomen soft, NTND, no guarding  : no CVA tenderness  Extremities: No peripheral edema,  2+ radial and DP pulses  Skin: Warm, well perfused, no rash   MSK: No gross deformities appreciated, +4cm horizontal linear laceration inferior to R patella, extensory mechanism intact, generalized ttp of the R knee, R anterior ttp of ankle, no tenderness over malleoli, no swelling, squeeze test negative  Neuro: No focal sensory or motor deficits   Psych: Appropriate mood and affect I have reviewed the triage vital signs.  Const: AAOx3, in NAD  Eyes: no conjunctival injection  HENT: NCAT, Neck supple, oral mucosa moist  CV: RRR, +S1, S2  Resp: CTAB, no respiratory distress  GI: Abdomen soft, NTND, no guarding  : no CVA tenderness  Extremities: No peripheral edema,  2+ radial and DP pulses  Skin: Warm, well perfused, no rash   MSK: No gross deformities appreciated, +4cm horizontal linear laceration inferior to R patella, extensor mechanism intact, generalized ttp of the R knee, R anterior ttp of ankle, no tenderness over medial/lateral malleoli/base of 5th metatarsal/navicular, no swelling, squeeze test negative, no ttp over syndesmosis  Neuro: No focal sensory or motor deficits, antalgic gait  Psych: Appropriate mood and affect I have reviewed the triage vital signs.  Const: AAOx3, in NAD  Eyes: no conjunctival injection  HENT: NCAT, Neck supple, oral mucosa moist  CV: RRR, +S1, S2  Resp: CTAB, no respiratory distress  GI: Abdomen soft, NTND, no guarding  : no CVA tenderness  Extremities: No peripheral edema,  2+ radial and DP pulses  Skin: Warm, well perfused  MSK: RLE: No knee swelling. +4cm horizontal linear laceration inferior to R patella without any surrounding erythema/drainage, no bone visualization, extensor mechanism intact, generalized ttp of the R knee, pt guarding with R knee exam but adequate R knee flexion, R anterior ttp of ankle, no tenderness over medial/lateral malleoli/base of 5th metatarsal/navicular, no swelling, squeeze test negative, no ttp over syndesmosis, R anterior drawer test of ankle negative  Neuro: No focal sensory or motor deficits, antalgic gait  Psych: Appropriate mood and affect

## 2022-12-19 RX ORDER — CEPHALEXIN 500 MG
1 CAPSULE ORAL
Qty: 28 | Refills: 0
Start: 2022-12-19 | End: 2022-12-25

## 2023-01-03 PROBLEM — Z78.9 OTHER SPECIFIED HEALTH STATUS: Chronic | Status: ACTIVE | Noted: 2022-12-18

## 2023-01-09 ENCOUNTER — APPOINTMENT (OUTPATIENT)
Dept: ORTHOPEDIC SURGERY | Facility: CLINIC | Age: 38
End: 2023-01-09
Payer: COMMERCIAL

## 2023-01-09 VITALS — HEIGHT: 69 IN | BODY MASS INDEX: 23.7 KG/M2 | WEIGHT: 160 LBS

## 2023-01-09 DIAGNOSIS — S80.01XA CONTUSION OF RIGHT KNEE, INITIAL ENCOUNTER: ICD-10-CM

## 2023-01-09 PROCEDURE — 99213 OFFICE O/P EST LOW 20 MIN: CPT

## 2023-01-09 NOTE — HISTORY OF PRESENT ILLNESS
[4] : 4 [3] : 3 [Dull/Aching] : dull/aching [Localized] : localized [Full time] : Work status: full time [] : Post Surgical Visit: no [FreeTextEntry3] : 12/19/22 [FreeTextEntry5] : 38 Y/O ALBERTO glynnal R Knee S/P Trip and fall on above DOI prior TX of XR CT and suture by [facility] on DOI.  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

## 2023-01-09 NOTE — DISCUSSION/SUMMARY
[de-identified] : Plan at this time is activity modification for the right knee.  He will get the intra-articular injection of the right hip under fluoroscopy and see whether the hip pain is also causing his knee pain.  I will see him back in the office as needed.

## 2023-01-09 NOTE — IMAGING
[de-identified] : Examination of the right lower extremity feels normal neurovascular exam.  His leg lengths are equal.  Examination of his right hip reveals a full range of motion with pain on flexion internal rotation.  He has pain to palpation over the anterior capsule.  He has no pain with abduction.  He has no weakness.  He has no redness rashes or visible scars.  Examination of the right knee reveals a well-healed scar with stitches over the front of the knee.  The stitches were removed uneventfully.  There is no signs of infection or DVT.  He can straight leg raise without a lag.  He can extend his knee against resistance.  His range of motion is full.  He has no pain over the IT band.  He has no effusion in the knee and no weakness.  There is no instability or apprehension.

## 2023-01-09 NOTE — DATA REVIEWED
[FreeTextEntry1] : Review of his x-rays from [facility] at the time of the contusion showed no evidence of arthritis loose bodies tumors masses or calcification and no sign of fracture or dislocation.

## 2023-01-09 NOTE — ASSESSMENT
[FreeTextEntry1] : Patient is a 37-year-old male with chief complaint of right knee pain.  His present illness 3 weeks ago he fell and landed directly onto his right knee.  He had stitches done but his patella tendon and quad tendon was intact.  He is doing well right now and has no pain.  He is walking well without assistive devices.  He does have a history of right hip pain where he was diagnosed with a labral tear.  This was giving him some pain in the iliotibial band for which she had a cortisone shot for.  Previous MRI was normal.  He now is essentially asymptomatic.  He does have some pain in the right hip and groin.  It is unrelated from his recent fall.  An MRI of his right hip showed a labral tear.  He was instructed to get an intra-articular injection of the right hip under fluoroscopy but has not gotten it yet.

## 2023-05-29 ENCOUNTER — NON-APPOINTMENT (OUTPATIENT)
Age: 38
End: 2023-05-29

## 2024-04-03 ENCOUNTER — EMERGENCY (EMERGENCY)
Facility: HOSPITAL | Age: 39
LOS: 0 days | Discharge: HOME | End: 2024-04-04
Attending: HOSPITALIST
Payer: COMMERCIAL

## 2024-04-03 VITALS — WEIGHT: 179.9 LBS | HEIGHT: 68 IN

## 2024-04-03 VITALS
OXYGEN SATURATION: 100 % | HEART RATE: 91 BPM | TEMPERATURE: 98 F | SYSTOLIC BLOOD PRESSURE: 124 MMHG | RESPIRATION RATE: 19 BRPM | DIASTOLIC BLOOD PRESSURE: 108 MMHG

## 2024-04-03 DIAGNOSIS — R11.2 NAUSEA WITH VOMITING, UNSPECIFIED: ICD-10-CM

## 2024-04-03 DIAGNOSIS — R10.13 EPIGASTRIC PAIN: ICD-10-CM

## 2024-04-03 DIAGNOSIS — R19.7 DIARRHEA, UNSPECIFIED: ICD-10-CM

## 2024-04-03 DIAGNOSIS — K52.9 NONINFECTIVE GASTROENTERITIS AND COLITIS, UNSPECIFIED: ICD-10-CM

## 2024-04-03 LAB
HCT VFR BLD CALC: 45.7 % — SIGNIFICANT CHANGE UP (ref 39–50)
HGB BLD-MCNC: 15.8 G/DL — SIGNIFICANT CHANGE UP (ref 13–17)
MCHC RBC-ENTMCNC: 28.6 PG — SIGNIFICANT CHANGE UP (ref 27–34)
MCHC RBC-ENTMCNC: 34.6 GM/DL — SIGNIFICANT CHANGE UP (ref 32–36)
MCV RBC AUTO: 82.8 FL — SIGNIFICANT CHANGE UP (ref 80–100)
PLATELET # BLD AUTO: 254 K/UL — SIGNIFICANT CHANGE UP (ref 150–400)
RBC # BLD: 5.52 M/UL — SIGNIFICANT CHANGE UP (ref 4.2–5.8)
RBC # FLD: 12.3 % — SIGNIFICANT CHANGE UP (ref 10.3–14.5)
WBC # BLD: 9.58 K/UL — SIGNIFICANT CHANGE UP (ref 3.8–10.5)
WBC # FLD AUTO: 9.58 K/UL — SIGNIFICANT CHANGE UP (ref 3.8–10.5)

## 2024-04-03 PROCEDURE — 80053 COMPREHEN METABOLIC PANEL: CPT

## 2024-04-03 PROCEDURE — 74177 CT ABD & PELVIS W/CONTRAST: CPT | Mod: 26,MC

## 2024-04-03 PROCEDURE — 96375 TX/PRO/DX INJ NEW DRUG ADDON: CPT

## 2024-04-03 PROCEDURE — 83690 ASSAY OF LIPASE: CPT

## 2024-04-03 PROCEDURE — 99053 MED SERV 10PM-8AM 24 HR FAC: CPT

## 2024-04-03 PROCEDURE — 36415 COLL VENOUS BLD VENIPUNCTURE: CPT

## 2024-04-03 PROCEDURE — 85025 COMPLETE CBC W/AUTO DIFF WBC: CPT

## 2024-04-03 PROCEDURE — 99285 EMERGENCY DEPT VISIT HI MDM: CPT

## 2024-04-03 PROCEDURE — 74177 CT ABD & PELVIS W/CONTRAST: CPT | Mod: MC

## 2024-04-03 PROCEDURE — 99284 EMERGENCY DEPT VISIT MOD MDM: CPT | Mod: 25

## 2024-04-03 PROCEDURE — 96374 THER/PROPH/DIAG INJ IV PUSH: CPT

## 2024-04-03 RX ORDER — SUCRALFATE 1 G
1 TABLET ORAL ONCE
Refills: 0 | Status: COMPLETED | OUTPATIENT
Start: 2024-04-03 | End: 2024-04-03

## 2024-04-03 RX ORDER — MORPHINE SULFATE 50 MG/1
4 CAPSULE, EXTENDED RELEASE ORAL ONCE
Refills: 0 | Status: DISCONTINUED | OUTPATIENT
Start: 2024-04-03 | End: 2024-04-03

## 2024-04-03 RX ORDER — ONDANSETRON 8 MG/1
4 TABLET, FILM COATED ORAL ONCE
Refills: 0 | Status: COMPLETED | OUTPATIENT
Start: 2024-04-03 | End: 2024-04-03

## 2024-04-03 RX ORDER — FAMOTIDINE 10 MG/ML
20 INJECTION INTRAVENOUS ONCE
Refills: 0 | Status: COMPLETED | OUTPATIENT
Start: 2024-04-03 | End: 2024-04-03

## 2024-04-03 RX ORDER — SODIUM CHLORIDE 9 MG/ML
2000 INJECTION INTRAMUSCULAR; INTRAVENOUS; SUBCUTANEOUS ONCE
Refills: 0 | Status: COMPLETED | OUTPATIENT
Start: 2024-04-03 | End: 2024-04-03

## 2024-04-03 RX ADMIN — Medication 30 MILLILITER(S): at 23:02

## 2024-04-03 RX ADMIN — SODIUM CHLORIDE 2000 MILLILITER(S): 9 INJECTION INTRAMUSCULAR; INTRAVENOUS; SUBCUTANEOUS at 23:02

## 2024-04-03 RX ADMIN — FAMOTIDINE 20 MILLIGRAM(S): 10 INJECTION INTRAVENOUS at 23:02

## 2024-04-03 RX ADMIN — ONDANSETRON 4 MILLIGRAM(S): 8 TABLET, FILM COATED ORAL at 23:01

## 2024-04-03 NOTE — ED PROVIDER NOTE - NSFOLLOWUPINSTRUCTIONS_ED_ALL_ED_FT
Please adhere to a bland diet  with smaller frequent meals throughout the day and stay well-hydrated as you are recuperating.  please take medications as prescribed.  Please follow-up with gastroenterology, referral provided.

## 2024-04-03 NOTE — ED PROVIDER NOTE - CLINICAL SUMMARY MEDICAL DECISION MAKING FREE TEXT BOX
39 y/o M well appearing with likely viral acute gastroenteritis, gastritis, however has had multiple episodes of this over the past month. Will obtain labs, imaging, medicate for sx and reassess. 37 y/o M well appearing with likely viral acute gastroenteritis, gastritis, however has had multiple episodes of this over the past month. Will obtain labs, imaging, medicate for sx and reassess. Feeling better after medical interventions.  Results of labs and CT noted and discussed with patient.  Will discharge home with medications for vomiting and gastritis.  Outpatient follow-up with gastroenterology..

## 2024-04-03 NOTE — ED PROVIDER NOTE - OBJECTIVE STATEMENT
37 y/o M with no pertinent PMHx of presents to the ED c/o severe abd pain with diarrhea since Monday, now with nausea and many episodes of vomiting. No fever, does have slight cough. Notes significant abd pain in epigastric area. Pt states he had similar episode of these sx 3 weeks ago. Unable to eat or drink anything today.

## 2024-04-03 NOTE — ED ADULT NURSE NOTE - OBJECTIVE STATEMENT
pt presenting to the ed c/o "stomach bug." pt states abdominal pain x 3 days followed by mutiple episodes of emesis and diarrhea. also endorsing fever tmax 101 and chills. pt states he was at a party and multiple people since have had this stomach bug. denies gu symptoms. pt states took tylenol PTA. decreased po intake.

## 2024-04-03 NOTE — ED ADULT NURSE NOTE - CHIEF COMPLAINT QUOTE
complains of severe abdominal pain with nausea, many episodes of vomiting and diarrhea over past couple of hours Name band;

## 2024-04-03 NOTE — ED PROVIDER NOTE - PHYSICAL EXAMINATION
Constitutional: NAD AAOx3  Eyes: PERRLA EOMI  Head: Normocephalic atraumatic  Mouth: MMM  Cardiac: regular rate   Resp: Lungs CTAB  GI: Abd s/nd, ttp epigastric abd  Neuro: CN2-12 intact  Skin: No visible rashes

## 2024-04-03 NOTE — ED PROVIDER NOTE - CARE PROVIDER_API CALL
Antonino Allan  Gastroenterology  [address] [place] [id number]  Phone: [phone]  Follow Up Time: 4-6 Days

## 2024-04-03 NOTE — ED PROVIDER NOTE - NS_ ATTENDINGSCRIBEDETAILS _ED_A_ED_FT
Lizy Pacheco MD: The history, relevant review of systems, past medical and surgical history, medical decision making, and physical examination was documented by the scribe in my presence and I attest to the accuracy of the documentation.

## 2024-04-03 NOTE — ED ADULT TRIAGE NOTE - CHIEF COMPLAINT QUOTE
complains of severe abdominal pain with nausea, many episodes of vomiting and diarrhea over past couple of hours

## 2024-04-03 NOTE — ED ADULT NURSE NOTE - SUICIDE SCREENING QUESTION 1

## 2024-04-03 NOTE — ED PROVIDER NOTE - PATIENT PORTAL LINK FT
You can access the FollowMyHealth Patient Portal offered by [facility] by registering at the following website: [email or web address]. By joining [facility]’s FollowMyHealth portal, you will also be able to view your health information using other applications (apps) compatible with our system.

## 2024-04-03 NOTE — ED ADULT NURSE NOTE - DRUG PRE-SCREENING (DAST -1)
Reason for call:  Other   Patient called regarding (reason for call): call back  Additional comments: Patient's daughter is calling for a wellness visit for her mother with Dr. Stuart, but she is not available until 5/28. Patient's daughter would like to have her seen sooner than that. Please advise daughter Lorene if there is a day that patient can be added to the schedule.     Phone number to reach patient:  Other phone number:  lorene - [phone]      Best Time:  Any time    Can we leave a detailed message on this number?  YES    Travel screening: Not Applicable     Statement Selected

## 2024-04-04 LAB
ALBUMIN SERPL ELPH-MCNC: 3.7 G/DL — SIGNIFICANT CHANGE UP (ref 3.3–5)
ALP SERPL-CCNC: 160 U/L — HIGH (ref 40–120)
ALT FLD-CCNC: 238 U/L — HIGH (ref 12–78)
ANION GAP SERPL CALC-SCNC: 6 MMOL/L — SIGNIFICANT CHANGE UP (ref 5–17)
AST SERPL-CCNC: 249 U/L — HIGH (ref 15–37)
BASOPHILS # BLD AUTO: 0 K/UL — SIGNIFICANT CHANGE UP (ref 0–0.2)
BASOPHILS NFR BLD AUTO: 0 % — SIGNIFICANT CHANGE UP (ref 0–2)
BILIRUB SERPL-MCNC: 1.1 MG/DL — SIGNIFICANT CHANGE UP (ref 0.2–1.2)
BUN SERPL-MCNC: 18 MG/DL — SIGNIFICANT CHANGE UP (ref 7–23)
CALCIUM SERPL-MCNC: 8.8 MG/DL — SIGNIFICANT CHANGE UP (ref 8.5–10.1)
CHLORIDE SERPL-SCNC: 105 MMOL/L — SIGNIFICANT CHANGE UP (ref 96–108)
CO2 SERPL-SCNC: 22 MMOL/L — SIGNIFICANT CHANGE UP (ref 22–31)
CREAT SERPL-MCNC: 1.39 MG/DL — HIGH (ref 0.5–1.3)
EGFR: 67 ML/MIN/1.73M2 — SIGNIFICANT CHANGE UP
EOSINOPHIL # BLD AUTO: 0 K/UL — SIGNIFICANT CHANGE UP (ref 0–0.5)
EOSINOPHIL NFR BLD AUTO: 0 % — SIGNIFICANT CHANGE UP (ref 0–6)
GIANT PLATELETS BLD QL SMEAR: PRESENT — SIGNIFICANT CHANGE UP
GLUCOSE SERPL-MCNC: 171 MG/DL — HIGH (ref 70–99)
LG PLATELETS BLD QL AUTO: SLIGHT — SIGNIFICANT CHANGE UP
LIDOCAIN IGE QN: 27 U/L — SIGNIFICANT CHANGE UP (ref 13–75)
LYMPHOCYTES # BLD AUTO: 0.38 K/UL — LOW (ref 1–3.3)
LYMPHOCYTES # BLD AUTO: 4 % — LOW (ref 13–44)
MANUAL SMEAR VERIFICATION: SIGNIFICANT CHANGE UP
MONOCYTES # BLD AUTO: 0.38 K/UL — SIGNIFICANT CHANGE UP (ref 0–0.9)
MONOCYTES NFR BLD AUTO: 4 % — SIGNIFICANT CHANGE UP (ref 2–14)
NEUTROPHILS # BLD AUTO: 8.81 K/UL — HIGH (ref 1.8–7.4)
NEUTROPHILS NFR BLD AUTO: 83 % — HIGH (ref 43–77)
NEUTS BAND # BLD: 9 % — HIGH (ref 0–8)
NRBC # BLD: 0 /100 WBCS — SIGNIFICANT CHANGE UP (ref 0–0)
NRBC # BLD: SIGNIFICANT CHANGE UP /100 WBCS (ref 0–0)
PLAT MORPH BLD: ABNORMAL
PLATELET COUNT - ESTIMATE: NORMAL — SIGNIFICANT CHANGE UP
POTASSIUM SERPL-MCNC: 3.1 MMOL/L — LOW (ref 3.5–5.3)
POTASSIUM SERPL-SCNC: 3.1 MMOL/L — LOW (ref 3.5–5.3)
PROT SERPL-MCNC: 7.5 GM/DL — SIGNIFICANT CHANGE UP (ref 6–8.3)
RBC BLD AUTO: NORMAL — SIGNIFICANT CHANGE UP
SODIUM SERPL-SCNC: 133 MMOL/L — LOW (ref 135–145)

## 2024-04-04 RX ORDER — SUCRALFATE 1 G
1 TABLET ORAL
Qty: 14 | Refills: 0
Start: 2024-04-04 | End: 2024-04-10

## 2024-04-04 RX ORDER — PANTOPRAZOLE SODIUM 20 MG/1
1 TABLET, DELAYED RELEASE ORAL
Qty: 7 | Refills: 0
Start: 2024-04-04 | End: 2024-04-10

## 2024-04-04 RX ORDER — ONDANSETRON 8 MG/1
1 TABLET, FILM COATED ORAL
Qty: 9 | Refills: 0
Start: 2024-04-04 | End: 2024-04-06

## 2024-04-04 RX ADMIN — MORPHINE SULFATE 4 MILLIGRAM(S): 50 CAPSULE, EXTENDED RELEASE ORAL at 00:04

## 2024-04-04 RX ADMIN — Medication 1 GRAM(S): at 00:04

## 2024-11-07 ENCOUNTER — NON-APPOINTMENT (OUTPATIENT)
Age: 39
End: 2024-11-07

## 2025-01-04 ENCOUNTER — NON-APPOINTMENT (OUTPATIENT)
Age: 40
End: 2025-01-04